# Patient Record
Sex: MALE | Race: WHITE | NOT HISPANIC OR LATINO | Employment: FULL TIME | URBAN - METROPOLITAN AREA
[De-identification: names, ages, dates, MRNs, and addresses within clinical notes are randomized per-mention and may not be internally consistent; named-entity substitution may affect disease eponyms.]

---

## 2021-11-14 ENCOUNTER — OFFICE VISIT (OUTPATIENT)
Dept: URGENT CARE | Facility: CLINIC | Age: 61
End: 2021-11-14

## 2021-11-14 VITALS
TEMPERATURE: 97.7 F | HEIGHT: 69 IN | BODY MASS INDEX: 35.25 KG/M2 | RESPIRATION RATE: 18 BRPM | DIASTOLIC BLOOD PRESSURE: 90 MMHG | OXYGEN SATURATION: 95 % | WEIGHT: 238 LBS | HEART RATE: 83 BPM | SYSTOLIC BLOOD PRESSURE: 160 MMHG

## 2021-11-14 DIAGNOSIS — S49.92XA INJURY OF LEFT SHOULDER, INITIAL ENCOUNTER: ICD-10-CM

## 2021-11-14 DIAGNOSIS — S42.292A CLOSED FRACTURE OF HEAD OF LEFT HUMERUS, INITIAL ENCOUNTER: ICD-10-CM

## 2021-11-14 PROCEDURE — 99204 OFFICE O/P NEW MOD 45 MIN: CPT | Performed by: NURSE PRACTITIONER

## 2021-11-14 RX ORDER — MELOXICAM 15 MG/1
15 TABLET ORAL DAILY
Qty: 30 TABLET | Refills: 0 | Status: CANCELLED | OUTPATIENT
Start: 2021-11-14

## 2021-11-14 RX ORDER — HYDROCODONE BITARTRATE AND ACETAMINOPHEN 5; 325 MG/1; MG/1
1 TABLET ORAL EVERY 4 HOURS PRN
Qty: 18 TABLET | Refills: 0 | Status: SHIPPED | OUTPATIENT
Start: 2021-11-14 | End: 2021-11-17

## 2021-11-14 ASSESSMENT — ENCOUNTER SYMPTOMS
EYE REDNESS: 0
NUMBNESS: 0
CHILLS: 0
SHORTNESS OF BREATH: 0
MYALGIAS: 0
NAUSEA: 0
JOINT SWELLING: 0
VOMITING: 0
FEVER: 0
WEAKNESS: 0
SORE THROAT: 0
DIZZINESS: 0

## 2021-11-14 NOTE — PROGRESS NOTES
"Subjective:   Pipo Stone is a 61 y.o. male who presents for Shoulder Pain ((L) shoulder, horse slammed into fence. x 3 weeks ago. Pain radiates from shoulder to bicep. )      Shoulder Pain  This is a new problem. Episode onset: 3 weeks; hit in left shoulder by horse. The problem occurs constantly. The problem has been unchanged. Pertinent negatives include no chest pain, chills, fever, joint swelling, myalgias, nausea, numbness, rash, sore throat, vomiting or weakness. Associated symptoms comments: Left shoulder pain  . Exacerbated by: movement, palaption  He has tried NSAIDs (Chiropractor) for the symptoms. The treatment provided no relief.       Review of Systems   Constitutional: Negative for chills and fever.   HENT: Negative for sore throat.    Eyes: Negative for redness.   Respiratory: Negative for shortness of breath.    Cardiovascular: Negative for chest pain.   Gastrointestinal: Negative for nausea and vomiting.   Genitourinary: Negative for dysuria.   Musculoskeletal: Positive for joint pain. Negative for joint swelling and myalgias.   Skin: Negative for rash.   Neurological: Negative for dizziness, weakness and numbness.       Medications:    • This patient does not have an active medication from one of the medication groupers.    Allergies: Patient has no known allergies.    Problem List: Pipo Stone does not have a problem list on file.    Surgical History:  No past surgical history on file.    Past Social Hx: Pipo Stone  reports that he has never smoked. He has never used smokeless tobacco. He reports that he does not drink alcohol and does not use drugs.     Past Family Hx:  Pipo Stone family history is not on file.     Problem list, medications, and allergies reviewed by myself today in Epic.     Objective:     /90   Pulse 83   Temp 36.5 °C (97.7 °F)   Resp 18   Ht 1.753 m (5' 9\")   Wt 108 kg (238 lb)   SpO2 95%   BMI 35.15 kg/m²     Physical Exam  Constitutional:       " Appearance: Normal appearance. He is not ill-appearing or toxic-appearing.   HENT:      Head: Normocephalic.      Right Ear: External ear normal.      Left Ear: External ear normal.      Nose: Nose normal.      Mouth/Throat:      Lips: Pink.      Mouth: Mucous membranes are moist.   Eyes:      General: Lids are normal.         Right eye: No discharge.         Left eye: No discharge.   Pulmonary:      Effort: Pulmonary effort is normal. No accessory muscle usage or respiratory distress.   Musculoskeletal:      Left shoulder: Tenderness present. No swelling, deformity, bony tenderness or crepitus. Decreased range of motion. Normal strength. Normal pulse.      Cervical back: Full passive range of motion without pain.      Comments: Jimenez positive, empty can test positive.   Skin:     Coloration: Skin is not pale.   Neurological:      Mental Status: He is alert and oriented to person, place, and time.   Psychiatric:         Mood and Affect: Mood normal.         Thought Content: Thought content normal.         Assessment/Plan:     Diagnosis and associated orders:   1. Injury of left shoulder, initial encounter  DX-SHOULDER 2+ LEFT    Referral to Orthopedics   2. Closed fracture of head of left humerus, initial encounter  Referral to Orthopedics    HYDROcodone-acetaminophen (NORCO) 5-325 MG Tab per tablet        Comments/MDM:     I independently reviewed the patient's imaging and agree with the interpretation of the radiologist.    1.  Findings concerning for impacted fracture of the humeral head at the greater tuberosity.  Lytic lesion is also a consideration.  2.  Possible loose body or fracture fragment within the joint superiorly.  3.  No dislocation.      Patient is a 61-year-old male present with stated above.  Patient having an undiagnosed new problem.  X-ray concerning of impacted fracture as this would be consistent with clinical findings and mechanism of injury.  Patient declining sling from clinic as he has 1  here today.  Advised to wear for immobilization.  Encouraged to ice.  Will patient follow-up with orthopedics for further evaluation and management.  In prescribing controlled substances to this patient, I certify that I have obtained and reviewed the medical history of Pipo Stone. I have also made a good jerardo effort to obtain applicable records from other providers who have treated the patient and records did not demonstrate any increased risk of substance abuse that would prevent me from prescribing controlled substances.     I have conducted a physical exam and documented it. I have reviewed Mr. Stone’s prescription history as maintained by the [place] Prescription Monitoring Program.     I have assessed the patient’s risk for abuse, dependency, and addiction using the validated Opioid Risk Tool available at https://www.mdcalc.com/bskkjg-lwnj-pzhy-ort-narcotic-abuse.     Given the above, I believe the benefits of controlled substance therapy outweigh the risks. The reasons for prescribing controlled substances include non-narcotic, oral analgesic alternatives have been inadequate for pain control. Accordingly, I have discussed the risk and benefits, treatment plan, and alternative therapies with the patient.              Differential diagnosis, natural history, supportive care, and indications for immediate follow-up discussed.    Advised the patient to follow-up with the primary care physician for recheck, reevaluation, and consideration of further management.    Please note that this dictation was created using voice recognition software. I have made a reasonable attempt to correct obvious errors, but I expect that there are errors of grammar and possibly content that I did not discover before finalizing the note.    This note was electronically signed by Nestor NICHOLS.